# Patient Record
(demographics unavailable — no encounter records)

---

## 2025-07-22 NOTE — PHYSICAL EXAM
[Alert] : alert [Normocephalic] : normocephalic [Flat Open Anterior [place]] : flat open anterior fontanelle [PERRL] : PERRL [Red Reflex Bilateral] : red reflex bilateral [Normally Placed Ears] : normally placed ears [Auricles Well Formed] : auricles well formed [Clear Tympanic membranes] : clear tympanic membranes [Light reflex present] : light reflex present [Bony structures visible] : bony structures visible [Patent Auditory Canal] : patent auditory canal [Nares Patent] : nares patent [Palate Intact] : palate intact [Uvula Midline] : uvula midline [Supple, full passive range of motion] : supple, full passive range of motion [Symmetric Chest Rise] : symmetric chest rise [Clear to Auscultation Bilaterally] : clear to auscultation bilaterally [Regular Rate and Rhythm] : regular rate and rhythm [S1, S2 present] : S1, S2 present [+2 Femoral Pulses] : +2 femoral pulses [Soft] : soft [Bowel Sounds] : bowel sounds present [Umbilical Stump Dry, Clean, Intact] : umbilical stump dry, clean, intact [Normal external genitailia] : normal external genitalia [Central Urethral Opening] : central urethral opening [Testicles Descended Bilaterally] : testicles descended bilaterally [Patent] : patent [Normally Placed] : normally placed [No Abnormal Lymph Nodes Palpated] : no abnormal lymph nodes palpated [Symmetric Flexed Extremities] : symmetric flexed extremities [Startle Reflex] : startle reflex present [Suck Reflex] : suck reflex present [Rooting] : rooting reflex present [Palmar Grasp] : palmar grasp present [Plantar Grasp] : plantar reflex present [Symmetric [place]] : symmetric [place]

## 2025-07-22 NOTE — PHYSICAL EXAM
[Alert] : alert [Normocephalic] : normocephalic [Flat Open Anterior [place]] : flat open anterior fontanelle [PERRL] : PERRL [Red Reflex Bilateral] : red reflex bilateral [Normally Placed Ears] : normally placed ears [Auricles Well Formed] : auricles well formed [Clear Tympanic membranes] : clear tympanic membranes [Light reflex present] : light reflex present [Bony structures visible] : bony structures visible [Patent Auditory Canal] : patent auditory canal [Nares Patent] : nares patent [Palate Intact] : palate intact [Uvula Midline] : uvula midline [Supple, full passive range of motion] : supple, full passive range of motion [Symmetric Chest Rise] : symmetric chest rise [Clear to Auscultation Bilaterally] : clear to auscultation bilaterally [Regular Rate and Rhythm] : regular rate and rhythm [S1, S2 present] : S1, S2 present [+2 Femoral Pulses] : +2 femoral pulses [Soft] : soft [Bowel Sounds] : bowel sounds present [Umbilical Stump Dry, Clean, Intact] : umbilical stump dry, clean, intact [Normal external genitailia] : normal external genitalia [Central Urethral Opening] : central urethral opening [Testicles Descended Bilaterally] : testicles descended bilaterally [Patent] : patent [Normally Placed] : normally placed [No Abnormal Lymph Nodes Palpated] : no abnormal lymph nodes palpated [Symmetric Flexed Extremities] : symmetric flexed extremities [Startle Reflex] : startle reflex present [Suck Reflex] : suck reflex present [Rooting] : rooting reflex present [Palmar Grasp] : palmar grasp present [Plantar Grasp] : plantar reflex present [Symmetric [place]] : symmetric [place]

## 2025-07-22 NOTE — HISTORY OF PRESENT ILLNESS
[Born at ___ Wks Gestation] : The patient was born at [unfilled] weeks gestation [] : via normal spontaneous vaginal delivery [[facility]] : at [facility] [(1) _____] : [unfilled] [(5) _____] : [unfilled] [BW: _____] : weight of [unfilled] [Length: _____] : length of [unfilled] [HC: _____] : head circumference of [unfilled] [DW: _____] : Discharge weight was [unfilled] [Time of Birth: _____] : Time of birth was [unfilled] [Age: ___] : [unfilled] year old mother [G: ___] : G [unfilled] [P: ___] : P [unfilled] [Significant Hx: ____] : The mother's  medical history is significant for [unfilled] [MBT: ____] : MBT - [unfilled] [None] : There are no risk factors [Hepatitis B Vaccine Given] : Hepatitis B vaccine given [TcB: _____] : Transcutaneous Bilirubin [unfilled] mg/dL [Breast milk] : breast milk [Hours between feeds ___] : Child is fed every [unfilled] hours [In Bassinet/Crib] : sleeps in bassinet/crib [On back] : sleeps on back [No] : No cigarette smoke exposure [Rear facing car seat in back seat] : Rear facing car seat in back seat [Carbon Monoxide Detectors] : Carbon monoxide detectors at home [Smoke Detectors] : Smoke detectors at home. [NO] : No [Normal] : Normal [___ voids per day] : [unfilled] voids per day [Frequency of stools: ___] : Frequency of stools: [unfilled]  stools [per day] : per day. [Yellow] : yellow

## 2025-07-22 NOTE — HISTORY OF PRESENT ILLNESS
[Born at ___ Wks Gestation] : The patient was born at [unfilled] weeks gestation [] : via normal spontaneous vaginal delivery [[facility]] : at [facility] [(1) _____] : [unfilled] [(5) _____] : [unfilled] [BW: _____] : weight of [unfilled] [Length: _____] : length of [unfilled] [HC: _____] : head circumference of [unfilled] [DW: _____] : Discharge weight was [unfilled] [Time of Birth: _____] : Time of birth was [unfilled] [Age: ___] : [unfilled] year old mother [G: ___] : G [unfilled] [P: ___] : P [unfilled] [Significant Hx: ____] : The mother's  medical history is significant for [unfilled] [MBT: ____] : MBT - [unfilled] [None] : There are no risk factors [Hepatitis B Vaccine Given] : Hepatitis B vaccine given [TcB: _____] : Transcutaneous Bilirubin [unfilled] mg/dL [Breast milk] : breast milk [Hours between feeds ___] : Child is fed every [unfilled] hours [In Bassinet/Crib] : sleeps in bassinet/crib [On back] : sleeps on back [No] : No cigarette smoke exposure [Rear facing car seat in back seat] : Rear facing car seat in back seat [Carbon Monoxide Detectors] : Carbon monoxide detectors at home [Smoke Detectors] : Smoke detectors at home. [NO] : No [Normal] : Normal [___ voids per day] : [unfilled] voids per day [Frequency of stools: ___] : Frequency of stools: [unfilled]  stools [per day] : per day. [Yellow] : yellow

## 2025-07-28 NOTE — HISTORY OF PRESENT ILLNESS
[de-identified] : weight check [FreeTextEntry6] : doing well exclusively nursing nursing well, on demand overnight cluster feeds bowels good many wet diapers  parents concerned about a diaper rash

## 2025-07-28 NOTE — PHYSICAL EXAM
[NL] : soft, nontender, nondistended, normal bowel sounds, no hepatosplenomegaly [de-identified] : candidal diaper rash noted

## 2025-07-28 NOTE — DISCUSSION/SUMMARY
[FreeTextEntry1] : Weight check doing well good weight gain encouragement provided  Candidal diaper rash Rx Nystatin  f/u at one month of age.